# Patient Record
Sex: FEMALE | Race: ASIAN | NOT HISPANIC OR LATINO | URBAN - METROPOLITAN AREA
[De-identification: names, ages, dates, MRNs, and addresses within clinical notes are randomized per-mention and may not be internally consistent; named-entity substitution may affect disease eponyms.]

---

## 2017-09-10 ENCOUNTER — EMERGENCY (EMERGENCY)
Facility: HOSPITAL | Age: 82
LOS: 1 days | Discharge: HOME | End: 2017-09-10
Attending: PERSONAL EMERGENCY RESPONSE ATTENDANT | Admitting: PERSONAL EMERGENCY RESPONSE ATTENDANT
Payer: MEDICARE

## 2017-09-10 VITALS
OXYGEN SATURATION: 100 % | SYSTOLIC BLOOD PRESSURE: 165 MMHG | RESPIRATION RATE: 18 BRPM | TEMPERATURE: 99 F | HEART RATE: 94 BPM | DIASTOLIC BLOOD PRESSURE: 87 MMHG

## 2017-09-10 DIAGNOSIS — S36.039A UNSPECIFIED LACERATION OF SPLEEN, INITIAL ENCOUNTER: ICD-10-CM

## 2017-09-10 LAB
ALBUMIN SERPL ELPH-MCNC: 4.2 G/DL — SIGNIFICANT CHANGE UP (ref 3.3–5)
ALP SERPL-CCNC: 61 U/L — SIGNIFICANT CHANGE UP (ref 40–120)
ALT FLD-CCNC: 15 U/L RC — SIGNIFICANT CHANGE UP (ref 10–45)
AMYLASE P1 CFR SERPL: 166 U/L — HIGH (ref 25–125)
ANION GAP SERPL CALC-SCNC: 17 MMOL/L — SIGNIFICANT CHANGE UP (ref 5–17)
APTT BLD: 28.8 SEC — SIGNIFICANT CHANGE UP (ref 27.5–37.4)
AST SERPL-CCNC: 26 U/L — SIGNIFICANT CHANGE UP (ref 10–40)
BASOPHILS # BLD AUTO: 0 K/UL — SIGNIFICANT CHANGE UP (ref 0–0.2)
BASOPHILS NFR BLD AUTO: 0.1 % — SIGNIFICANT CHANGE UP (ref 0–2)
BILIRUB SERPL-MCNC: 0.5 MG/DL — SIGNIFICANT CHANGE UP (ref 0.2–1.2)
BLD GP AB SCN SERPL QL: NEGATIVE — SIGNIFICANT CHANGE UP
BUN SERPL-MCNC: 16 MG/DL — SIGNIFICANT CHANGE UP (ref 7–23)
CALCIUM SERPL-MCNC: 9.2 MG/DL — SIGNIFICANT CHANGE UP (ref 8.4–10.5)
CHLORIDE SERPL-SCNC: 104 MMOL/L — SIGNIFICANT CHANGE UP (ref 96–108)
CO2 SERPL-SCNC: 22 MMOL/L — SIGNIFICANT CHANGE UP (ref 22–31)
CREAT SERPL-MCNC: 0.94 MG/DL — SIGNIFICANT CHANGE UP (ref 0.5–1.3)
EOSINOPHIL # BLD AUTO: 0 K/UL — SIGNIFICANT CHANGE UP (ref 0–0.5)
EOSINOPHIL NFR BLD AUTO: 0.3 % — SIGNIFICANT CHANGE UP (ref 0–6)
GLUCOSE SERPL-MCNC: 161 MG/DL — HIGH (ref 70–99)
HCT VFR BLD CALC: 39.4 % — SIGNIFICANT CHANGE UP (ref 34.5–45)
HGB BLD-MCNC: 13.5 G/DL — SIGNIFICANT CHANGE UP (ref 11.5–15.5)
INR BLD: 0.99 RATIO — SIGNIFICANT CHANGE UP (ref 0.88–1.16)
LIDOCAIN IGE QN: 84 U/L — HIGH (ref 7–60)
LYMPHOCYTES # BLD AUTO: 13.1 % — SIGNIFICANT CHANGE UP (ref 13–44)
LYMPHOCYTES # BLD AUTO: 2.1 K/UL — SIGNIFICANT CHANGE UP (ref 1–3.3)
MCHC RBC-ENTMCNC: 32.1 PG — SIGNIFICANT CHANGE UP (ref 27–34)
MCHC RBC-ENTMCNC: 34.3 GM/DL — SIGNIFICANT CHANGE UP (ref 32–36)
MCV RBC AUTO: 93.7 FL — SIGNIFICANT CHANGE UP (ref 80–100)
MONOCYTES # BLD AUTO: 0.9 K/UL — SIGNIFICANT CHANGE UP (ref 0–0.9)
MONOCYTES NFR BLD AUTO: 5.6 % — SIGNIFICANT CHANGE UP (ref 2–14)
NEUTROPHILS # BLD AUTO: 12.9 K/UL — HIGH (ref 1.8–7.4)
NEUTROPHILS NFR BLD AUTO: 80.9 % — HIGH (ref 43–77)
PLATELET # BLD AUTO: 310 K/UL — SIGNIFICANT CHANGE UP (ref 150–400)
POTASSIUM SERPL-MCNC: 3.8 MMOL/L — SIGNIFICANT CHANGE UP (ref 3.5–5.3)
POTASSIUM SERPL-SCNC: 3.8 MMOL/L — SIGNIFICANT CHANGE UP (ref 3.5–5.3)
PROT SERPL-MCNC: 7.4 G/DL — SIGNIFICANT CHANGE UP (ref 6–8.3)
PROTHROM AB SERPL-ACNC: 10.8 SEC — SIGNIFICANT CHANGE UP (ref 9.8–12.7)
RBC # BLD: 4.2 M/UL — SIGNIFICANT CHANGE UP (ref 3.8–5.2)
RBC # FLD: 11.5 % — SIGNIFICANT CHANGE UP (ref 10.3–14.5)
RH IG SCN BLD-IMP: POSITIVE — SIGNIFICANT CHANGE UP
RH IG SCN BLD-IMP: POSITIVE — SIGNIFICANT CHANGE UP
SODIUM SERPL-SCNC: 143 MMOL/L — SIGNIFICANT CHANGE UP (ref 135–145)
WBC # BLD: 15.9 K/UL — HIGH (ref 3.8–10.5)
WBC # FLD AUTO: 15.9 K/UL — HIGH (ref 3.8–10.5)

## 2017-09-10 PROCEDURE — 99285 EMERGENCY DEPT VISIT HI MDM: CPT | Mod: 25,GC

## 2017-09-10 PROCEDURE — 93010 ELECTROCARDIOGRAM REPORT: CPT

## 2017-09-10 RX ORDER — SENNA PLUS 8.6 MG/1
2 TABLET ORAL AT BEDTIME
Qty: 0 | Refills: 0 | Status: DISCONTINUED | OUTPATIENT
Start: 2017-09-10 | End: 2017-09-11

## 2017-09-10 RX ORDER — SODIUM CHLORIDE 9 MG/ML
1000 INJECTION INTRAMUSCULAR; INTRAVENOUS; SUBCUTANEOUS
Qty: 0 | Refills: 0 | Status: DISCONTINUED | OUTPATIENT
Start: 2017-09-10 | End: 2017-09-10

## 2017-09-10 RX ORDER — OXYCODONE HYDROCHLORIDE 5 MG/1
10 TABLET ORAL EVERY 6 HOURS
Qty: 0 | Refills: 0 | Status: DISCONTINUED | OUTPATIENT
Start: 2017-09-10 | End: 2017-09-11

## 2017-09-10 RX ORDER — ACETAMINOPHEN 500 MG
650 TABLET ORAL EVERY 6 HOURS
Qty: 0 | Refills: 0 | Status: DISCONTINUED | OUTPATIENT
Start: 2017-09-10 | End: 2017-09-11

## 2017-09-10 RX ORDER — DOCUSATE SODIUM 100 MG
100 CAPSULE ORAL THREE TIMES A DAY
Qty: 0 | Refills: 0 | Status: DISCONTINUED | OUTPATIENT
Start: 2017-09-10 | End: 2017-09-11

## 2017-09-10 RX ORDER — OXYCODONE HYDROCHLORIDE 5 MG/1
5 TABLET ORAL EVERY 4 HOURS
Qty: 0 | Refills: 0 | Status: DISCONTINUED | OUTPATIENT
Start: 2017-09-10 | End: 2017-09-11

## 2017-09-10 RX ORDER — SODIUM CHLORIDE 9 MG/ML
1000 INJECTION, SOLUTION INTRAVENOUS
Qty: 0 | Refills: 0 | Status: DISCONTINUED | OUTPATIENT
Start: 2017-09-10 | End: 2017-09-11

## 2017-09-10 RX ADMIN — SODIUM CHLORIDE 80 MILLILITER(S): 9 INJECTION INTRAMUSCULAR; INTRAVENOUS; SUBCUTANEOUS at 20:10

## 2017-09-10 NOTE — ED PROVIDER NOTE - ATTENDING CONTRIBUTION TO CARE
Attending MD Pearce.  Agree with above.  PT is an 82 yr old female presenting to ED as a transfer from [facility] after she suffered a mechanical fall down 8-10 stairs this afternoon and was found on pan-scan at [facility] to have a perisplenic lac, hematoma and L subcapsular renal hematoma.  Pt states that she was walking down the stairs at her new house and there was no hand rail and she misjudged the step and fell forward down the stairs.  She has a hematoma to R posterior scalp without lac, hematoma to L inferior orbit with EOMI, TTP over LUQ.  Bruising to dorsum of R hand without TTP.  From of all extremities and neurovascularly intact in all extremities.  No resp distress.  On arrival vital signs stable and pt A & O x 3 in no acute distress.  Pt has hx of HTN, HLD and takes only a baby ASA no other anticoagulant.  Pt had head trauma but non-actionable CT head.  Neuro intact on arrival.

## 2017-09-10 NOTE — H&P ADULT - ATTENDING COMMENTS
Seen and examined on 9/10/17.  83 y/o s/p fall down stairs with low-grade splenic and left kidney injuries (subcapsular hematomas on both, so likely grade 1). Imaging not transferred here--will obtain in AM and review. Reports reviewed.  H/H WNL  - admit to trauma  - NPO for now  - trend H/H  - PT eval  - Hospitalist eval  - f/u imaging in AM once disc arrives

## 2017-09-10 NOTE — H&P ADULT - HISTORY OF PRESENT ILLNESS
82 F presents after a fall. Patient was walking downstairs when she slipped, and tumbled down about 8 steps. She landed on her L side and struck her head. No loss of consciousness. Patient was evaluated at [facility] and was found to have splenic and renal hematoma. Was transferred to [facility] for further management. Currently complains of L shoulder pain, L flank pain, and L thigh pain. Patient has no other complaints.

## 2017-09-10 NOTE — H&P ADULT - ASSESSMENT
82 F presents after a fall. Found to have a splenic laceration with a subcapsular hematoma, and a subcapsular hematoma of the L kidney on outside reports. Transfer center notified that images need to be sent to [facility]. Will admit to Trauma Surgery ([facility]).  -Pain control  -NPO for now  -IVF  -Mechanical VTE prophylaxis. Repeat Hct here is the same at OSH. Will recheck Hct in AM.  -Will get PT evaluation  -D/w attending  RADHA Dalton  [id number]

## 2017-09-10 NOTE — H&P ADULT - NSHPLABSRESULTS_GEN_ALL_CORE
09-10-17    WBC: 15.9  Hgb: 13.5  Hct: 39.4  Plt: 310    Na: 143  K: 3.8  Cl: 104  HCO3: 22  BUN: 16  Cr: 0.94  Glu: 161    Ca: 9.2    Protein: 7.4  Albumin: 4.2  Total bilirubin: 0.5  AST: 26  ALT: 15  Lipase: 84    INR: 0.99  PTT: 28.8    Scans from [place]:  CT head: Subcutaneous hematoma noted in the posterior R parietal region  CT c-spine: No fracture  CT face: Subcutaneous hematoma noted in the L malar region. No evidence of fracture.  CT chest: No evidence of pneumothorax. Cardiomegaly.  CT abdomen/pelvis: Splenic laceration with small perisplenic and subcapsular hematoma. Small subcapsular hematoma noted in the mid portion of L kidney. L adrenal adenoma.  Hip x-ray: No evidence of fracture  L femur x-ray: No evidence of fracture  L shoulder x-ray: No evidence of fracture  CXR: Patchy density noted in the mid R lung. Cardiomegaly.

## 2017-09-10 NOTE — ED PROVIDER NOTE - MEDICAL DECISION MAKING DETAILS
blunt abd trauma. hemodynamically stable, non-focal neuro exam. well appearing. - splenic and renal hematoma on CT. Pain controlled without po medications. WIll obtain labs, trauma consult. analgesia prn.

## 2017-09-10 NOTE — ED PROVIDER NOTE - OBJECTIVE STATEMENT
82yoF pmhx of htn, hld, on asa tx from [facility] sp mechanical fall down 8 stairs this afteroon, found on ct to have subcapsular splenic laceration and subcapsular left kidney hematoma. PT currently denying pain at rest. HD stable.   no loc, no midline neck pain, no change in vision, no focal numbness or weakness, no radicular pain, no chest pain, +abdominal pain,- transient, now resolved,  no chest pain, no dyspnea, no pleuritic pain, no dysphagia, no odynophagia,

## 2017-09-10 NOTE — H&P ADULT - NSHPPHYSICALEXAM_GEN_ALL_CORE
Tmax: 37.1 (09-10-17 @ 19:46)  HR: 74  BP: 131/74  RR: 17  SpO2: 100%    Gen: NAD  Head: No obvious injuries  Neck: Tmax: 37.1 (09-10-17 @ 19:46)  HR: 74  BP: 131/74  RR: 17  SpO2: 100%    Gen: NAD  Head: Hematoma on back of head  Neck: No c-spine tenderness  Chest: No obvious deformities  Lungs: Non-labored breathing  Abdomen: Soft, ND, L flank tenderness  Pelvis: Stable  Extremities: Tenderness over L thigh and L shoulder  Vascular: Palpable radial pulses bilaterally  Back: No tenderness

## 2017-09-10 NOTE — ED ADULT NURSE NOTE - OBJECTIVE STATEMENT
Patient a + o x 4 s/p fall down 10-12 stairs, mechanical fall, denies LOC, denies nausea and vomiting, transferred from [facility] for Trauma Consult.  Patient comes with results from [facility] stating that she has a splenic laceration and left kidney laceration.  Patient c/o left shoulder pain with movement 7/10, 0/10 at rest.  Patient has left facial swelling and redness at the cheekbone, denies pain at this time.  Cardiac monitoring initiated and ongoing.  Patient is with family at bedside, oriented to area, made comfortable, safety maintained.  Will continue to monitor.

## 2017-09-11 VITALS
SYSTOLIC BLOOD PRESSURE: 146 MMHG | RESPIRATION RATE: 17 BRPM | TEMPERATURE: 99 F | HEART RATE: 87 BPM | DIASTOLIC BLOOD PRESSURE: 82 MMHG | OXYGEN SATURATION: 96 %

## 2017-09-11 LAB
ANION GAP SERPL CALC-SCNC: 12 MMOL/L — SIGNIFICANT CHANGE UP (ref 5–17)
BUN SERPL-MCNC: 14 MG/DL — SIGNIFICANT CHANGE UP (ref 7–23)
CALCIUM SERPL-MCNC: 8.9 MG/DL — SIGNIFICANT CHANGE UP (ref 8.4–10.5)
CHLORIDE SERPL-SCNC: 104 MMOL/L — SIGNIFICANT CHANGE UP (ref 96–108)
CO2 SERPL-SCNC: 25 MMOL/L — SIGNIFICANT CHANGE UP (ref 22–31)
CREAT SERPL-MCNC: 0.81 MG/DL — SIGNIFICANT CHANGE UP (ref 0.5–1.3)
GLUCOSE SERPL-MCNC: 103 MG/DL — HIGH (ref 70–99)
HCT VFR BLD CALC: 35.5 % — SIGNIFICANT CHANGE UP (ref 34.5–45)
HCT VFR BLD CALC: 37.7 % — SIGNIFICANT CHANGE UP (ref 34.5–45)
HGB BLD-MCNC: 12.1 G/DL — SIGNIFICANT CHANGE UP (ref 11.5–15.5)
HGB BLD-MCNC: 12.8 G/DL — SIGNIFICANT CHANGE UP (ref 11.5–15.5)
MCHC RBC-ENTMCNC: 31.8 PG — SIGNIFICANT CHANGE UP (ref 27–34)
MCHC RBC-ENTMCNC: 31.9 PG — SIGNIFICANT CHANGE UP (ref 27–34)
MCHC RBC-ENTMCNC: 33.9 GM/DL — SIGNIFICANT CHANGE UP (ref 32–36)
MCHC RBC-ENTMCNC: 34 GM/DL — SIGNIFICANT CHANGE UP (ref 32–36)
MCV RBC AUTO: 93.8 FL — SIGNIFICANT CHANGE UP (ref 80–100)
MCV RBC AUTO: 93.9 FL — SIGNIFICANT CHANGE UP (ref 80–100)
PLATELET # BLD AUTO: 289 K/UL — SIGNIFICANT CHANGE UP (ref 150–400)
PLATELET # BLD AUTO: 293 K/UL — SIGNIFICANT CHANGE UP (ref 150–400)
POTASSIUM SERPL-MCNC: 3.8 MMOL/L — SIGNIFICANT CHANGE UP (ref 3.5–5.3)
POTASSIUM SERPL-SCNC: 3.8 MMOL/L — SIGNIFICANT CHANGE UP (ref 3.5–5.3)
RBC # BLD: 3.78 M/UL — LOW (ref 3.8–5.2)
RBC # BLD: 4.02 M/UL — SIGNIFICANT CHANGE UP (ref 3.8–5.2)
RBC # FLD: 11.4 % — SIGNIFICANT CHANGE UP (ref 10.3–14.5)
RBC # FLD: 11.7 % — SIGNIFICANT CHANGE UP (ref 10.3–14.5)
SODIUM SERPL-SCNC: 141 MMOL/L — SIGNIFICANT CHANGE UP (ref 135–145)
WBC # BLD: 8.5 K/UL — SIGNIFICANT CHANGE UP (ref 3.8–10.5)
WBC # BLD: 9.4 K/UL — SIGNIFICANT CHANGE UP (ref 3.8–10.5)
WBC # FLD AUTO: 8.5 K/UL — SIGNIFICANT CHANGE UP (ref 3.8–10.5)
WBC # FLD AUTO: 9.4 K/UL — SIGNIFICANT CHANGE UP (ref 3.8–10.5)

## 2017-09-11 PROCEDURE — 99285 EMERGENCY DEPT VISIT HI MDM: CPT | Mod: 25

## 2017-09-11 PROCEDURE — 86900 BLOOD TYPING SEROLOGIC ABO: CPT

## 2017-09-11 PROCEDURE — 86901 BLOOD TYPING SEROLOGIC RH(D): CPT

## 2017-09-11 PROCEDURE — 85610 PROTHROMBIN TIME: CPT

## 2017-09-11 PROCEDURE — 97161 PT EVAL LOW COMPLEX 20 MIN: CPT

## 2017-09-11 PROCEDURE — 93005 ELECTROCARDIOGRAM TRACING: CPT

## 2017-09-11 PROCEDURE — 86850 RBC ANTIBODY SCREEN: CPT

## 2017-09-11 PROCEDURE — 80048 BASIC METABOLIC PNL TOTAL CA: CPT

## 2017-09-11 PROCEDURE — 85027 COMPLETE CBC AUTOMATED: CPT

## 2017-09-11 PROCEDURE — 85730 THROMBOPLASTIN TIME PARTIAL: CPT

## 2017-09-11 PROCEDURE — 82150 ASSAY OF AMYLASE: CPT

## 2017-09-11 PROCEDURE — 80053 COMPREHEN METABOLIC PANEL: CPT

## 2017-09-11 PROCEDURE — 83690 ASSAY OF LIPASE: CPT

## 2017-09-11 RX ORDER — INFLUENZA VIRUS VACCINE 15; 15; 15; 15 UG/.5ML; UG/.5ML; UG/.5ML; UG/.5ML
0.5 SUSPENSION INTRAMUSCULAR ONCE
Qty: 0 | Refills: 0 | Status: DISCONTINUED | OUTPATIENT
Start: 2017-09-11 | End: 2017-09-11

## 2017-09-11 RX ORDER — OXYCODONE HYDROCHLORIDE 5 MG/1
1 TABLET ORAL
Qty: 30 | Refills: 0 | OUTPATIENT
Start: 2017-09-11

## 2017-09-11 RX ORDER — LIDOCAINE 4 G/100G
1 CREAM TOPICAL
Qty: 7 | Refills: 0 | OUTPATIENT
Start: 2017-09-11

## 2017-09-11 RX ORDER — DOCUSATE SODIUM 100 MG
1 CAPSULE ORAL
Qty: 0 | Refills: 0 | COMMUNITY
Start: 2017-09-11

## 2017-09-11 RX ORDER — SENNA PLUS 8.6 MG/1
2 TABLET ORAL
Qty: 0 | Refills: 0 | COMMUNITY
Start: 2017-09-11

## 2017-09-11 RX ORDER — ATORVASTATIN CALCIUM 80 MG/1
40 TABLET, FILM COATED ORAL AT BEDTIME
Qty: 0 | Refills: 0 | Status: DISCONTINUED | OUTPATIENT
Start: 2017-09-11 | End: 2017-09-11

## 2017-09-11 RX ORDER — AMLODIPINE BESYLATE 2.5 MG/1
5 TABLET ORAL DAILY
Qty: 0 | Refills: 0 | Status: DISCONTINUED | OUTPATIENT
Start: 2017-09-11 | End: 2017-09-11

## 2017-09-11 RX ORDER — ACETAMINOPHEN 500 MG
2 TABLET ORAL
Qty: 0 | Refills: 0 | COMMUNITY
Start: 2017-09-11

## 2017-09-11 RX ORDER — LIDOCAINE 4 G/100G
1 CREAM TOPICAL DAILY
Qty: 0 | Refills: 0 | Status: DISCONTINUED | OUTPATIENT
Start: 2017-09-11 | End: 2017-09-11

## 2017-09-11 RX ADMIN — SODIUM CHLORIDE 100 MILLILITER(S): 9 INJECTION, SOLUTION INTRAVENOUS at 00:36

## 2017-09-11 RX ADMIN — LIDOCAINE 1 PATCH: 4 CREAM TOPICAL at 18:25

## 2017-09-11 RX ADMIN — AMLODIPINE BESYLATE 5 MILLIGRAM(S): 2.5 TABLET ORAL at 18:15

## 2017-09-11 NOTE — DISCHARGE NOTE ADULT - NS AS ACTIVITY OBS
Do not make important decisions/Showering allowed/no driving while on prescription pain medication/Walking-Indoors allowed/Walking-Outdoors allowed/No Heavy lifting/straining/Stairs allowed

## 2017-09-11 NOTE — DISCHARGE NOTE ADULT - CARE PLAN
Principal Discharge DX:	Laceration of spleen, initial encounter  Goal:	recover from fall  Instructions for follow-up, activity and diet:	Follow up with your primary care physician or Dr. Rousseau to recheck your CBC in one week. Please call to schedule an appointment.   Please call [phone] to schedule an appointment if needed with Dr. Rousseau.   [address]  [address]  [place], [place], [address]     NOTIFY YOUR SURGEON IF: You have any bleeding that does not stop, any fever (over 100.4 F) or chills, persistent nausea/vomiting, persistent diarrhea, or if your pain is not controlled on your discharge pain medications.

## 2017-09-11 NOTE — CONSULT NOTE ADULT - SUBJECTIVE AND OBJECTIVE BOX
HPI:    83 yo F with PMH of HTN, HLD, ?TIA on aspirin presenting with mechanical fall. Pt was visiting daughter who has house under contrusction- pt was walking down stairs at construction house (no railing) and missed bottom step falling to ground (hit L. side and head). Pt denies prodrome, LOC, seizure activity, lightheadedness, dizziness, cp, sob related to fall. Typically very active, swims 1 hour daily for many years, does not require assistance with walking. Pt was evaluated by [facility] and found to have splenic and renal hematomas, therefore was sent to [facility] ED for further evaluation. Admitted currently to trauma surgery service. Pt currently reports improved pain of L. shoulder, L. flank, L. thigh. Denies headache, visual changes, cp, sob, fever/chills, abdominal pain, nausea/vomiting. Pt walking around halls of unit without difficulty.       PAST MEDICAL & SURGICAL HISTORY:  HLD  HTN  TIA ( several years ago, on asa 81 daily)  no significant surgical hx.       Review of Systems:   CONSTITUTIONAL: No fever, weight loss, or fatigue  EYES: No eye pain, visual disturbances, or discharge  ENMT:  No difficulty hearing, tinnitus, vertigo; No sinus or throat pain  NECK: No pain or stiffness  BREASTS: No pain, masses, or nipple discharge  RESPIRATORY: No cough, wheezing, chills or hemoptysis; No shortness of breath  CARDIOVASCULAR: No chest pain, palpitations, dizziness, or leg swelling  GASTROINTESTINAL: No abdominal or epigastric pain. No nausea, vomiting, or hematemesis; No diarrhea or constipation. No melena or hematochezia.  GENITOURINARY: No dysuria, frequency, hematuria, or incontinence  NEUROLOGICAL: No headaches, memory loss, loss of strength, numbness, or tremors  SKIN: No itching, burning, rashes, or lesions   LYMPH NODES: No enlarged glands  ENDOCRINE: No heat or cold intolerance; No hair loss  MUSCULOSKELETAL: No joint pain or swelling; No muscle, back, or extremity pain  PSYCHIATRIC: No depression, anxiety, mood swings, or difficulty sleeping  HEME/LYMPH: No easy bruising, or bleeding gums  ALLERY AND IMMUNOLOGIC: No hives or eczema    Allergies    naproxen (Rash)    Intolerances        Social History: lives with daughter and daughters family, takes care of self, can carry out all ADLs. walks without assistance., swims daily for exercise. denies tobacco or alcohol use.    FAMILY HISTORY:      MEDICATIONS  (STANDING):  docusate sodium 100 milliGRAM(s) Oral three times a day  senna 2 Tablet(s) Oral at bedtime  influenza   Vaccine 0.5 milliLiter(s) IntraMuscular once  lidocaine   Patch 1 Patch Transdermal daily    MEDICATIONS  (PRN):  acetaminophen   Tablet. 650 milliGRAM(s) Oral every 6 hours PRN Mild Pain (1 - 3)  oxyCODONE    IR 5 milliGRAM(s) Oral every 4 hours PRN Moderate Pain (4 - 6)  oxyCODONE    IR 10 milliGRAM(s) Oral every 6 hours PRN Severe Pain (7 - 10)      home meds  asa 81 mg daily  lipitor 40mg daily  norvasc 5mg daily       CAPILLARY BLOOD GLUCOSE        I&O's Summary    VS T 98.7, P 78, /77, R 18, O2 97% RA  PHYSICAL EXAM:  GENERAL: NAD, well-developed  HEAD:  Atraumatic, Normocephalic  EYES: EOMI, PERRLA, conjunctiva and sclera clear  NECK: Supple, No JVD  CHEST/LUNG: Clear to auscultation bilaterally; No wheeze  HEART: Regular rate and rhythm; No murmurs, rubs, or gallops  ABDOMEN: Soft, Nontender, Nondistended; Bowel sounds present  EXTREMITIES:  2+ Peripheral Pulses, No clubbing, cyanosis, or edema  PSYCH: AAOx3  NEUROLOGY: non-focal  SKIN: No rashes or lesions    LABS:                        12.8   9.4   )-----------( 293      ( 11 Sep 2017 11:48 )             37.7     09-11    141  |  104  |  14  ----------------------------<  103<H>  3.8   |  25  |  0.81    Ca    8.9      11 Sep 2017 07:12    TPro  7.4  /  Alb  4.2  /  TBili  0.5  /  DBili  x   /  AST  26  /  ALT  15  /  AlkPhos  61  09-10    PT/INR - ( 10 Sep 2017 20:14 )   PT: 10.8 sec;   INR: 0.99 ratio         PTT - ( 10 Sep 2017 20:14 )  PTT:28.8 sec          RADIOLOGY & ADDITIONAL TESTS:    	Scans from [place]:  	CT head: Subcutaneous hematoma noted in the posterior R parietal region  	CT c-spine: No fracture  	CT face: Subcutaneous hematoma noted in the L malar region. No evidence of fracture.  	CT chest: No evidence of pneumothorax. Cardiomegaly.  	CT abdomen/pelvis: Splenic laceration with small perisplenic and subcapsular hematoma. Small subcapsular hematoma noted in the mid portion of L kidney. L adrenal adenoma.  	Hip x-ray: No evidence of fracture  	L femur x-ray: No evidence of fracture  	L shoulder x-ray: No evidence of fracture  CXR: Patchy density noted in the mid R lung. Cardiomegaly.    Imaging Personally Reviewed: EKG- normal sinus rhythm. no acute ischemic changes.    Care Discussed with Consultants/Other Prov[facility]: [facility]

## 2017-09-11 NOTE — PHYSICAL THERAPY INITIAL EVALUATION ADULT - PERTINENT HX OF CURRENT PROBLEM, REHAB EVAL
Pt is a 81 y/o female admitted to [facility] on 9/10/17  presents after a fall. Patient was walking downstairs when she slipped, and tumbled down about 8 steps. She landed on her L side and struck her head. No loss of consciousness. Patient was evaluated at [facility] and was found to have splenic and renal hematoma and was transferred to [facility] for further management.  Currently complains of L shoulder pain, L flank pain, and L thigh pain.

## 2017-09-11 NOTE — DISCHARGE NOTE ADULT - PATIENT PORTAL LINK FT
“You can access the FollowHealth Patient Portal, offered by [facility], by registering with the following website: [email or web address]”

## 2017-09-11 NOTE — DISCHARGE NOTE ADULT - MEDICATION SUMMARY - MEDICATIONS TO TAKE
I will START or STAY ON the medications listed below when I get home from the hospital:    acetaminophen 325 mg oral tablet  -- 2 tab(s) by mouth every 6 hours, As needed, Mild Pain (1 - 3)  -- Indication: For pain    atorvastatin 40 mg oral tablet  -- 1 tab(s) by mouth once a day  -- Indication: For high cholesterol    amLODIPine 5 mg oral tablet  -- 1 tab(s) by mouth once a day  -- Indication: For high blood pressure    senna oral tablet  -- 2 tab(s) by mouth once a day (at bedtime)  -- Indication: For constipation    docusate sodium 100 mg oral capsule  -- 1 cap(s) by mouth 3 times a day  -- Indication: For constipation I will START or STAY ON the medications listed below when I get home from the hospital:    acetaminophen 325 mg oral tablet  -- 2 tab(s) by mouth every 6 hours, As needed, Mild Pain (1 - 3)  -- Indication: For pain    oxyCODONE 5 mg oral tablet  -- 1-2 tab(s) by mouth every 4-6 hours, As needed,  Pain (4 - 6) MDD:8  -- Indication: For pain    atorvastatin 40 mg oral tablet  -- 1 tab(s) by mouth once a day  -- Indication: For high cholesterol    amLODIPine 5 mg oral tablet  -- 1 tab(s) by mouth once a day  -- Indication: For high blood pressure    lidocaine 5% topical film  -- Apply on skin to affected area once a day. 12 hours on/12 hours off MDD:1  -- Indication: For pain    senna oral tablet  -- 2 tab(s) by mouth once a day (at bedtime)  -- Indication: For constipation    docusate sodium 100 mg oral capsule  -- 1 cap(s) by mouth 3 times a day  -- Indication: For constipation

## 2017-09-11 NOTE — CONSULT NOTE ADULT - ASSESSMENT
81 yo F with PMH of HTN, HLD, TIA p/w traumatic fall resulting in splenic laceration with subcapsular hematoma, and subcapsular hematoma of kidney. Admitted to trauma surgery service.     #mechanical fall  pt able to recount entire event, no concerning signs of syncope. EKG without evidence of ACS  -PT consult   -pain control - agree with oxycodone for severe pain however will d/c 10mg dosing as pt not requiring, tylenol for mild pain.    #splenic laceration, splenic and renal hematomas  -holding asa 81 daily at this time  -surgical team to obtain images from [facility]  -pain control  -hemoglobin monitoring- currently stable. no signs of active bleeding    #hypertension  can resume home norvasc 5mg daily    #HLD  -add lipitor 40mg bedtime    #hx of TIA  -. holding aspirin at this time    #dispo  -pending 83 yo F with PMH of HTN, HLD, TIA p/w traumatic fall resulting in splenic laceration with subcapsular hematoma, and subcapsular hematoma of kidney. Admitted to trauma surgery service.     #mechanical fall  pt able to recount entire event, no concerning signs of syncope. EKG without evidence of ACS  -PT consult   -pain control - agree with oxycodone for severe pain however will d/c 10mg dosing as pt not requiring, tylenol for mild pain.    #splenic laceration, splenic and renal hematomas  -holding asa 81 daily at this time  -surgical team to obtain images from [facility]  -pain control  -hemoglobin monitoring- currently stable. no signs of active bleeding    #hypertension  can resume home norvasc 5mg daily    #HLD  -add lipitor 40mg bedtime    #hx of TIA  -. holding aspirin at this time    #dvt ppx  SCDs and ambulate for now    #dispo  -pending

## 2017-09-11 NOTE — DISCHARGE NOTE ADULT - HOSPITAL COURSE
82 F presents after a fall. Patient was walking downstairs when she slipped, and tumbled down about 8 steps. She landed on her L side and struck her head. No loss of consciousness. Patient was evaluated at [facility] and was found to have splenic and renal hematoma. Was transferred to [facility] for further management. Pt admitted for low-grade splenic and left kidney injuries (subcapsular hematomas on both, so likely grade Imaging obtained and reviewed., H/H WNL and stable on repeat. Physical therapy evaluated patient and did not find any deficits. Pt stable for discharge to home without home pt or needs. Pt instructed to follow up with her primary care physician or Dr. Rousseau to recheck a CBC.

## 2017-09-11 NOTE — DISCHARGE NOTE ADULT - INSTRUCTIONS
regular diet If unable to tolerate diet, nausea, vomiting, fever above 100.3, chills, or an increase in pain, notify provider or return to ER.

## 2017-09-11 NOTE — DISCHARGE NOTE ADULT - CONDITIONS AT DISCHARGE
Patient a&ox4. Patient VSS. Patient safety maintained. Patient Iv removed with no signs/symptoms of redness/swelling.

## 2017-09-11 NOTE — ED ADULT NURSE REASSESSMENT NOTE - NS ED NURSE REASSESS COMMENT FT1
EKG completed at 2000
Patient is ambulatory with steady gait to bathroom.  Patient denies pain at this time.  Will continue to monitor.
patient does not want any pain medication
Patient does not want pain medications. Patient to be transported upstairs.

## 2017-09-11 NOTE — DISCHARGE NOTE ADULT - PLAN OF CARE
recover from fall Follow up with your primary care physician or Dr. Rousseau to recheck your CBC in one week. Please call to schedule an appointment.   Please call [phone] to schedule an appointment if needed with Dr. Rousseau.   [address]  [address]  [place], [place], [address]     NOTIFY YOUR SURGEON IF: You have any bleeding that does not stop, any fever (over 100.4 F) or chills, persistent nausea/vomiting, persistent diarrhea, or if your pain is not controlled on your discharge pain medications.

## 2017-09-14 RX ORDER — ATORVASTATIN CALCIUM 80 MG/1
1 TABLET, FILM COATED ORAL
Qty: 0 | Refills: 0 | COMMUNITY

## 2017-09-14 RX ORDER — ASPIRIN/CALCIUM CARB/MAGNESIUM 324 MG
1 TABLET ORAL
Qty: 0 | Refills: 0 | COMMUNITY

## 2017-09-14 RX ORDER — AMLODIPINE BESYLATE 2.5 MG/1
1 TABLET ORAL
Qty: 0 | Refills: 0 | COMMUNITY

## 2019-07-19 NOTE — DISCHARGE NOTE ADULT - CARE PROVIDER_API CALL
Chauncey Rousseau), Surgery  Critical Care  [address]  [address], [place] [address]  Phone: [phone]  Fax: [phone]
grimace/restless/guarding/rubbing/muscle tension/eyes open wide

## 2022-04-29 NOTE — PHYSICAL THERAPY INITIAL EVALUATION ADULT - DISCHARGE PLANNER MADE AWARE
[facility]  Dermatology  [address]  [place] [address]  Phone: [phone]  Fax:   Follow Up Time: 2 weeks    [facility]  Neurology  [address] [place] [address]  Phone: [phone]  Fax:   Follow Up Time: Routine     [facility]  Dermatology  [address], [address]  [place] [address]  Phone: [phone]  Fax:   Follow Up Time: 2 weeks    [facility]  Neurology  [address] [place] [address]  Phone: [phone]  Fax:   Follow Up Time: Routine    [facility]  Primary Care  [address] [place] [address]  Phone: [phone]  Fax:   Follow Up Time: 2 weeks